# Patient Record
Sex: FEMALE | Race: WHITE | URBAN - METROPOLITAN AREA
[De-identification: names, ages, dates, MRNs, and addresses within clinical notes are randomized per-mention and may not be internally consistent; named-entity substitution may affect disease eponyms.]

---

## 2021-09-14 ENCOUNTER — INPATIENT (INPATIENT)
Facility: HOSPITAL | Age: 71
LOS: 0 days | Discharge: HOME | DRG: 915 | End: 2021-09-15
Attending: STUDENT IN AN ORGANIZED HEALTH CARE EDUCATION/TRAINING PROGRAM | Admitting: STUDENT IN AN ORGANIZED HEALTH CARE EDUCATION/TRAINING PROGRAM
Payer: MEDICARE

## 2021-09-14 VITALS — HEART RATE: 95 BPM | OXYGEN SATURATION: 96 % | RESPIRATION RATE: 18 BRPM | HEIGHT: 61 IN | WEIGHT: 176.37 LBS

## 2021-09-14 LAB
ALBUMIN SERPL ELPH-MCNC: 4.3 G/DL — SIGNIFICANT CHANGE UP (ref 3.3–5)
ALP SERPL-CCNC: 91 U/L — SIGNIFICANT CHANGE UP (ref 40–120)
ALT FLD-CCNC: 19 U/L — SIGNIFICANT CHANGE UP (ref 10–45)
ANION GAP SERPL CALC-SCNC: 11 MMOL/L — SIGNIFICANT CHANGE UP (ref 5–17)
APPEARANCE UR: CLEAR — SIGNIFICANT CHANGE UP
AST SERPL-CCNC: 25 U/L — SIGNIFICANT CHANGE UP (ref 10–40)
BASE EXCESS BLDA CALC-SCNC: -1.8 MMOL/L — SIGNIFICANT CHANGE UP (ref -2–3)
BASE EXCESS BLDV CALC-SCNC: 0.8 MMOL/L — SIGNIFICANT CHANGE UP (ref -2–3)
BASOPHILS # BLD AUTO: 0.04 K/UL — SIGNIFICANT CHANGE UP (ref 0–0.2)
BASOPHILS NFR BLD AUTO: 0.3 % — SIGNIFICANT CHANGE UP (ref 0–2)
BILIRUB SERPL-MCNC: 0.3 MG/DL — SIGNIFICANT CHANGE UP (ref 0.2–1.2)
BILIRUB UR-MCNC: NEGATIVE — SIGNIFICANT CHANGE UP
BUN SERPL-MCNC: 18 MG/DL — SIGNIFICANT CHANGE UP (ref 7–23)
CA-I SERPL-SCNC: 1.18 MMOL/L — SIGNIFICANT CHANGE UP (ref 1.15–1.33)
CALCIUM SERPL-MCNC: 9.9 MG/DL — SIGNIFICANT CHANGE UP (ref 8.4–10.5)
CHLORIDE SERPL-SCNC: 104 MMOL/L — SIGNIFICANT CHANGE UP (ref 96–108)
CO2 BLDA-SCNC: 25 MMOL/L — HIGH (ref 19–24)
CO2 BLDV-SCNC: 30.3 MMOL/L — HIGH (ref 22–26)
CO2 SERPL-SCNC: 27 MMOL/L — SIGNIFICANT CHANGE UP (ref 22–31)
COLOR SPEC: YELLOW — SIGNIFICANT CHANGE UP
CREAT SERPL-MCNC: 0.92 MG/DL — SIGNIFICANT CHANGE UP (ref 0.5–1.3)
DIFF PNL FLD: NEGATIVE — SIGNIFICANT CHANGE UP
EOSINOPHIL # BLD AUTO: 0.06 K/UL — SIGNIFICANT CHANGE UP (ref 0–0.5)
EOSINOPHIL NFR BLD AUTO: 0.4 % — SIGNIFICANT CHANGE UP (ref 0–6)
GAS PNL BLDV: 136 MMOL/L — SIGNIFICANT CHANGE UP (ref 136–145)
GAS PNL BLDV: SIGNIFICANT CHANGE UP
GLUCOSE SERPL-MCNC: 144 MG/DL — HIGH (ref 70–99)
GLUCOSE UR QL: NEGATIVE — SIGNIFICANT CHANGE UP
HCO3 BLDA-SCNC: 24 MMOL/L — SIGNIFICANT CHANGE UP (ref 21–28)
HCO3 BLDV-SCNC: 28 MMOL/L — SIGNIFICANT CHANGE UP (ref 22–29)
HCT VFR BLD CALC: 43.3 % — SIGNIFICANT CHANGE UP (ref 34.5–45)
HGB BLD-MCNC: 14 G/DL — SIGNIFICANT CHANGE UP (ref 11.5–15.5)
IMM GRANULOCYTES NFR BLD AUTO: 0.5 % — SIGNIFICANT CHANGE UP (ref 0–1.5)
KETONES UR-MCNC: NEGATIVE — SIGNIFICANT CHANGE UP
LACTATE SERPL-SCNC: 2.1 MMOL/L — HIGH (ref 0.5–2)
LEUKOCYTE ESTERASE UR-ACNC: NEGATIVE — SIGNIFICANT CHANGE UP
LYMPHOCYTES # BLD AUTO: 0.98 K/UL — LOW (ref 1–3.3)
LYMPHOCYTES # BLD AUTO: 6.6 % — LOW (ref 13–44)
MCHC RBC-ENTMCNC: 28.9 PG — SIGNIFICANT CHANGE UP (ref 27–34)
MCHC RBC-ENTMCNC: 32.3 GM/DL — SIGNIFICANT CHANGE UP (ref 32–36)
MCV RBC AUTO: 89.3 FL — SIGNIFICANT CHANGE UP (ref 80–100)
MONOCYTES # BLD AUTO: 0.21 K/UL — SIGNIFICANT CHANGE UP (ref 0–0.9)
MONOCYTES NFR BLD AUTO: 1.4 % — LOW (ref 2–14)
NEUTROPHILS # BLD AUTO: 13.4 K/UL — HIGH (ref 1.8–7.4)
NEUTROPHILS NFR BLD AUTO: 90.8 % — HIGH (ref 43–77)
NITRITE UR-MCNC: NEGATIVE — SIGNIFICANT CHANGE UP
NRBC # BLD: 0 /100 WBCS — SIGNIFICANT CHANGE UP (ref 0–0)
NT-PROBNP SERPL-SCNC: 63 PG/ML — SIGNIFICANT CHANGE UP (ref 0–300)
PCO2 BLDA: 42 MMHG — HIGH (ref 32–35)
PCO2 BLDV: 58 MMHG — HIGH (ref 39–42)
PH BLDA: 7.36 — SIGNIFICANT CHANGE UP (ref 7.35–7.45)
PH BLDV: 7.3 — LOW (ref 7.32–7.43)
PH UR: 7 — SIGNIFICANT CHANGE UP (ref 5–8)
PLATELET # BLD AUTO: 251 K/UL — SIGNIFICANT CHANGE UP (ref 150–400)
PO2 BLDA: 89 MMHG — SIGNIFICANT CHANGE UP (ref 83–108)
PO2 BLDV: <35 MMHG — SIGNIFICANT CHANGE UP (ref 25–45)
POTASSIUM BLDV-SCNC: 3.9 MMOL/L — SIGNIFICANT CHANGE UP (ref 3.5–5.1)
POTASSIUM SERPL-MCNC: 4.3 MMOL/L — SIGNIFICANT CHANGE UP (ref 3.5–5.3)
POTASSIUM SERPL-SCNC: 4.3 MMOL/L — SIGNIFICANT CHANGE UP (ref 3.5–5.3)
PROT SERPL-MCNC: 7.6 G/DL — SIGNIFICANT CHANGE UP (ref 6–8.3)
PROT UR-MCNC: NEGATIVE MG/DL — SIGNIFICANT CHANGE UP
RBC # BLD: 4.85 M/UL — SIGNIFICANT CHANGE UP (ref 3.8–5.2)
RBC # FLD: 13 % — SIGNIFICANT CHANGE UP (ref 10.3–14.5)
SAO2 % BLDA: 98.2 % — HIGH (ref 94–98)
SAO2 % BLDV: 55.2 % — LOW (ref 67–88)
SARS-COV-2 RNA SPEC QL NAA+PROBE: SIGNIFICANT CHANGE UP
SODIUM SERPL-SCNC: 142 MMOL/L — SIGNIFICANT CHANGE UP (ref 135–145)
SP GR SPEC: 1.01 — SIGNIFICANT CHANGE UP (ref 1–1.03)
TROPONIN T SERPL-MCNC: <0.01 NG/ML — SIGNIFICANT CHANGE UP (ref 0–0.01)
UROBILINOGEN FLD QL: 0.2 E.U./DL — SIGNIFICANT CHANGE UP
WBC # BLD: 14.77 K/UL — HIGH (ref 3.8–10.5)
WBC # FLD AUTO: 14.77 K/UL — HIGH (ref 3.8–10.5)

## 2021-09-14 PROCEDURE — 71046 X-RAY EXAM CHEST 2 VIEWS: CPT | Mod: 26,59

## 2021-09-14 PROCEDURE — 99285 EMERGENCY DEPT VISIT HI MDM: CPT | Mod: 25

## 2021-09-14 PROCEDURE — 99291 CRITICAL CARE FIRST HOUR: CPT

## 2021-09-14 PROCEDURE — 31500 INSERT EMERGENCY AIRWAY: CPT

## 2021-09-14 PROCEDURE — 93010 ELECTROCARDIOGRAM REPORT: CPT | Mod: NC,59

## 2021-09-14 PROCEDURE — 71045 X-RAY EXAM CHEST 1 VIEW: CPT | Mod: 26

## 2021-09-14 RX ORDER — PROPOFOL 10 MG/ML
50 INJECTION, EMULSION INTRAVENOUS
Qty: 500 | Refills: 0 | Status: DISCONTINUED | OUTPATIENT
Start: 2021-09-14 | End: 2021-09-14

## 2021-09-14 RX ORDER — ENOXAPARIN SODIUM 100 MG/ML
40 INJECTION SUBCUTANEOUS AT BEDTIME
Refills: 0 | Status: DISCONTINUED | OUTPATIENT
Start: 2021-09-14 | End: 2021-09-14

## 2021-09-14 RX ORDER — NOREPINEPHRINE BITARTRATE/D5W 8 MG/250ML
0.05 PLASTIC BAG, INJECTION (ML) INTRAVENOUS
Qty: 8 | Refills: 0 | Status: DISCONTINUED | OUTPATIENT
Start: 2021-09-14 | End: 2021-09-15

## 2021-09-14 RX ORDER — PROPOFOL 10 MG/ML
20 INJECTION, EMULSION INTRAVENOUS ONCE
Refills: 0 | Status: COMPLETED | OUTPATIENT
Start: 2021-09-14 | End: 2021-09-14

## 2021-09-14 RX ORDER — SODIUM CHLORIDE 9 MG/ML
1000 INJECTION INTRAMUSCULAR; INTRAVENOUS; SUBCUTANEOUS ONCE
Refills: 0 | Status: COMPLETED | OUTPATIENT
Start: 2021-09-14 | End: 2021-09-14

## 2021-09-14 RX ORDER — ENOXAPARIN SODIUM 100 MG/ML
40 INJECTION SUBCUTANEOUS EVERY 12 HOURS
Refills: 0 | Status: DISCONTINUED | OUTPATIENT
Start: 2021-09-14 | End: 2021-09-15

## 2021-09-14 RX ORDER — FENTANYL CITRATE 50 UG/ML
0.5 INJECTION INTRAVENOUS
Qty: 2500 | Refills: 0 | Status: DISCONTINUED | OUTPATIENT
Start: 2021-09-14 | End: 2021-09-15

## 2021-09-14 RX ORDER — CHLORHEXIDINE GLUCONATE 213 G/1000ML
1 SOLUTION TOPICAL
Refills: 0 | Status: DISCONTINUED | OUTPATIENT
Start: 2021-09-14 | End: 2021-09-15

## 2021-09-14 RX ORDER — PROPOFOL 10 MG/ML
50 INJECTION, EMULSION INTRAVENOUS
Qty: 1000 | Refills: 0 | Status: DISCONTINUED | OUTPATIENT
Start: 2021-09-14 | End: 2021-09-15

## 2021-09-14 RX ADMIN — PROPOFOL 20 MILLIGRAM(S): 10 INJECTION, EMULSION INTRAVENOUS at 17:00

## 2021-09-14 RX ADMIN — PROPOFOL 24 MICROGRAM(S)/KG/MIN: 10 INJECTION, EMULSION INTRAVENOUS at 17:10

## 2021-09-14 RX ADMIN — FENTANYL CITRATE 4.31 MICROGRAM(S)/KG/HR: 50 INJECTION INTRAVENOUS at 17:30

## 2021-09-14 RX ADMIN — Medication 8.08 MICROGRAM(S)/KG/MIN: at 21:24

## 2021-09-14 RX ADMIN — SODIUM CHLORIDE 1000 MILLILITER(S): 9 INJECTION INTRAMUSCULAR; INTRAVENOUS; SUBCUTANEOUS at 19:05

## 2021-09-14 RX ADMIN — PROPOFOL 20 MILLIGRAM(S): 10 INJECTION, EMULSION INTRAVENOUS at 16:50

## 2021-09-14 NOTE — ED ADULT NURSE NOTE - CHIEF COMPLAINT QUOTE
pt sustained bronchospasm during intubation at a plastic surgery office; pt was intubated successfully but then transported here for medical management; ETT 6.5 22 @ lip

## 2021-09-14 NOTE — H&P ADULT - ASSESSMENT
This is a 71 year old F with PMHx of HTN, HLD, anaphylaxis presented to [facility] ED intubated after difficult airway in ambulatory surgical center with concern for anaphylactic reaction to rocuronium. Admitted for controlled extubation for 9/15.      NEUROLOGY  #Encephalopathy due to sedation  Low concern for anoxic brain injury per report with no desaturations, following commands and moving extremities with sedation lifted  - maintain RASS -1 to -2 until SBT in AM  - full neuro exam during SBT    PULMONARY  #Difficult Airway  Patient with 3 failed DL attempts and some bloody sputum afterwards which resolved per anesthesiologist  - plan for extubation with tube exchanger and anesthesia at bedside 9/15 AM  - no angioedema was seen; most likely this was due to an allergic reaction as epinephrine, decadron, and benadryl relieved the sx    #Anaphylaxis  Patient s/p epinephrine, benadryl, decadron, nebs at outpatient surgical center. Unclear whether this is resolved anaphylaxis or instead traumatic/difficult intubation  - monitor for dermatologic signs or bronchospasm on extubation  - avoid rocuronium    #Hypercapnic respiratory failure  VBG pCO2 58, which may correlate with obstruction 2/2 anphylaxis, but does not correlate with clear lungs on exam without recent albuterol.  - ABG to confirm ventilation, titrate vent settings prn  - initial ABG results showed adequate ventilation settings for overnight; will continue to monitor pt's response     CARDIAC  #Distributive shock 2/2 vasoplegia  BP dropped from 120/80 down to 70s/50s shortly after sedation was increased for vent compliance. Patient was bolused with 1L NS and levo was started then quickly weaned off. Likely vasoplegia from sedation which was fluid responsive.  - wean off levo  - fluids as needed for SBP<90    #HTN  On amlodipine 5mg, aspirin 81mg  - on extubation can start home meds as needed    GASTROINTESTINAL  #Concern for Ileus/abdominal distension  - decompress abdomen with sump    RENAL  #C/f diabetes insipidus  Clear urine in muñiz on ED admission, but low suspicion for cerebral herniation given no deficits when sedation lifted. May instead have received diuretics without signout as such or copious fluids while at surgical center.  - UA - clear; no infection or blood    - f/u urine lytes    ENDOCRINE  #no active issues    HEME  #no active issues     INFECTIOUS  #Leukocytosis  Likely 2/2 difficult intubation. Patient may have some degree of aspiration but no consolidation seen on CXR and no fever  - trend CBC, CXR      Fluids: s/p 1L NS in ED  Electrolytes: replete K>4, Mg>2, Phos>2.5  Diet: NPO  DVT Prophylaxis: Lovenox  GI Prophylaxis: not indicated  Lines: PIV x2  Code Status: Full code  Dispo: MICU

## 2021-09-14 NOTE — CONSULT NOTE ADULT - ATTENDING COMMENTS
Patient with acute respiratory failure of unclear etiology though difficulty intubating except for number 6 ETT may point to airway narrowing issue maybe from laryngospasm, not much else clinically going with anaphylaxis though this is a consideration. She has a positive leak test now; will try to extubate with anesthesia in AM. Transient hypotension likely sedation related has resolved.

## 2021-09-14 NOTE — ED PROVIDER NOTE - OBJECTIVE STATEMENT
70 y/o F w/ Hx of HTN and HLD was at a surgery clinic for a face lift today. Anesthesiologist gave her rocuronium and pt started to have spasms. As per anesthesiologist, unable to back her well, gave micro epi total 100 mg IV over an hr. Pt intubated using RSI. Pt given decadron and benadryl for presumed anaphylaxis to rocuronium. Upon arrival to ED, pt intubated w/ 6.5 ET tube and placed on 15 mg propofol.

## 2021-09-14 NOTE — ED ADULT NURSE NOTE - NSIMPLEMENTINTERV_GEN_ALL_ED
Implemented All Fall Risk Interventions:  [place] to call system. Call bell, personal items and telephone within reach. Instruct patient to call for assistance. Room bathroom lighting operational. Non-slip footwear when patient is off stretcher. Physically safe environment: no spills, clutter or unnecessary equipment. Stretcher in lowest position, wheels locked, appropriate side rails in place. Provide visual cue, wrist band, yellow gown, etc. Monitor gait and stability. Monitor for mental status changes and reorient to person, place, and time. Review medications for side effects contributing to fall risk. Reinforce activity limits and safety measures with patient and family.

## 2021-09-14 NOTE — H&P ADULT - NSHPPHYSICALEXAM_GEN_ALL_CORE
ICU Vital Signs Last 24 Hrs  T(C): 36.2 (14 Sep 2021 22:15), Max: 36.2 (14 Sep 2021 22:15)  T(F): 97.2 (14 Sep 2021 22:15), Max: 97.2 (14 Sep 2021 22:15)  HR: 64 (15 Sep 2021 00:00) (60 - 95)  BP: 109/57 (15 Sep 2021 00:00) (78/51 - 186/77)  BP(mean): 78 (15 Sep 2021 00:00) (75 - 93)  ABP: --  ABP(mean): --  RR: 12 (15 Sep 2021 00:00) (12 - 18)  SpO2: 97% (15 Sep 2021 00:00) (96% - 100%)    Mode: AC/ CMV (Assist Control/ Continuous Mandatory Ventilation)  RR (machine): 12  TV (machine): 450  FiO2: 50  PEEP: 5  ITime: 1  MAP: 9.7  PIP: 27    General: obese female intubated and sedated  HEENT:  PERRL, bilaterally narrow prior to levo initiation   Lungs: CTA B/L with slightly coarse breath sounds typical for 6.5 tube; audible cuff leak when cuff deflated at bedside  Cardiovascular: regular r/r, no MRG  Gastrointestinal: soft, nontender, no organomegaly appreciated  Extremities: 2+ b/l MAAME at ankles, up to 1+ at shins    : muñiz in place  Skin: warm; intact along chest, abdomen, legs, arms, groin  Neurological: sedation lifted to fent 2.5 prop 10, patient opening eyes spontaneously, nodding head to command, moving all extremities spontaneously and to command  Vasc: 2+ pulses ICU Vital Signs Last 24 Hrs  T(C): 36.2 (14 Sep 2021 22:15), Max: 36.2 (14 Sep 2021 22:15)  T(F): 97.2 (14 Sep 2021 22:15), Max: 97.2 (14 Sep 2021 22:15)  HR: 64 (15 Sep 2021 00:00) (60 - 95)  BP: 109/57 (15 Sep 2021 00:00) (78/51 - 186/77)  BP(mean): 78 (15 Sep 2021 00:00) (75 - 93)  ABP: --  ABP(mean): --  RR: 12 (15 Sep 2021 00:00) (12 - 18)  SpO2: 97% (15 Sep 2021 00:00) (96% - 100%)    Mode: AC/ CMV (Assist Control/ Continuous Mandatory Ventilation)  RR (machine): 12  TV (machine): 450  FiO2: 50  PEEP: 5  ITime: 1  MAP: 9.7  PIP: 27    General: obese female intubated and sedated  HEENT:  PERRL, bilaterally narrow prior to levo initiation   Lungs: CTA B/L with slightly coarse breath sounds typical for 6.5 tube; audible cuff leak when cuff deflated at bedside  Cardiovascular: regular r/r, no MRG  Gastrointestinal: soft, nontender, no organomegaly appreciated  Extremities: 2+ b/l MAAME at ankles, up to 1+ at shins    : muñiz in place  Skin: warm; intact along chest, abdomen, legs, arms, groin  Vasc: 2+ pulses

## 2021-09-14 NOTE — ED PROVIDER NOTE - CLINICAL SUMMARY MEDICAL DECISION MAKING FREE TEXT BOX
70 y/o F presents today after spasms after receiving rocuronium at surgical clinic for face lift today. Will obtain basic labs and admit. 70 y/o F presents today after spasms after receiving rocuronium at surgical clinic for face lift today. Will obtain basic labs and admit.   pt intubated, upon arrival pt was awakening, given propofol x 2 20mg,   history obtained from anesthesiologist and plastic surgeon performing the surgery at the outpt clinic  as per anesthesiologist pt was likely allergic ro rocuronium. after pt given matt, pt was noted to have difficulty with airway and "bagging"  pt given 100mcg of epi total   pt intubated with tube 6.5 tube as per anesthesiologist  pt was a difficult intubation  pt to be exutabed inpt. micu consulted. micu agrees with plan, will observe pt for possible anaphylaxis and to keep pt intubated as for now.   pt was started on propofol drip and fentanyl added as pt was awakening and bp was starting to normalize and drop.

## 2021-09-14 NOTE — H&P ADULT - HISTORY OF PRESENT ILLNESS
This is a 71 year old F with PMHx of HTN, HLD, prior anaphylaxis with unknown trigger x2, allergies to latex and penicillin presents from the ambulatory surgery center of Dr. Magana intubated with concern for anaphylaxis during intubation prior to elective facelift. History obtained by anesthesiologist who did the case, Dr. Yulia Browne. The patient bagged easily on propofol, fentanyl, and versed and preoxygenated for ~10 minutes. Rocuronium was administered, and immediately after, the patient was noted to be much more difficult to bag with maximum tidal volumes 50-100cc. Due to concern for bronchospasm, intubation was continued including 3 failed attempts at direct laryngoscopy then successful intubation with 6.5 tube over a bougie. During this time, no desaturations were noted, and the patient was then ventilated with 100% FiO2. Epinephrine was administered intravenously, 2 albuterol nebulizers were given, 10mg decadron, and an unknown amount of IV benadryl. Due to concern for ongoing anaphylaxis, surgery was aborted. The patient was evaluated for the next 2 hours at the surgical center and was noted to be tolerating pressure support with minimal settings other than 100% FiO2. Extubation was considered, but upon deflating the cuff there was an audible cuff leak but concern for persistent bronchospasm as well as poor vent compliance on light sedation. Given the difficult airway and unclear resolution of suspected anaphylaxis, the patient was transferred to [facility] for further evaluation. Patient has had 2 episodes of anaphylaxis before per anesthesiologist. Once was a reaction to an unspecified plant, the other has no known source. Patient did not require intubation during those episodes, and this was her first time under general anesthesia. This is a 71 year old F with PMHx of HTN, HLD, prior anaphylaxis with unknown trigger x2, allergies to latex and penicillin presents from the ambulatory surgery center of Dr. Magana intubated with concern for anaphylaxis during intubation prior to elective facelift. History obtained by anesthesiologist who did the case, Dr. Yulia Browne. The patient bagged easily on propofol, fentanyl, and versed and preoxygenated for ~10 minutes. Rocuronium was administered, and immediately after, the patient was noted to be much more difficult to bag with maximum tidal volumes 50-100cc. Due to concern for bronchospasm, intubation was continued including 3 failed attempts at direct laryngoscopy then successful intubation with 6.5 tube over a bougie. During this time, no desaturations were noted, and the patient was then ventilated with 100% FiO2. Epinephrine was administered intravenously, 2 albuterol nebulizers were given, 10mg decadron, and an unknown amount of IV benadryl. Due to concern for ongoing anaphylaxis, surgery was aborted. The patient was evaluated for the next 2 hours at the surgical center and was noted to be tolerating pressure support with minimal settings other than 100% FiO2. Extubation was considered, but upon deflating the cuff there was an audible cuff leak but concern for persistent bronchospasm as well as poor vent compliance on light sedation. Given the difficult airway and unclear resolution of suspected anaphylaxis, the patient was transferred to [facility] for further evaluation. Patient has had 2 episodes of anaphylaxis before per anesthesiologist. Once was a reaction to an unspecified plant, the other has no known source. Patient did not require intubation during those episodes, and this was her first time under general anesthesia.    ED vitals: T 96.1, HR 83, /63, , SpO2 100% on 50% FiO2  Labs significant: WBC 14.77, lactate 2.1, VBG with pCO2 58 and O2 55%  Imaging/EKG: CXR: rotated exam with poor penetration, no gross consolidations  Interventions prior to ED: ~100mcg epinephrine, 10mg decadron, albuterol x3, IV Benadryl  Interventions in ED: 1L NS, norepinephrine

## 2021-09-14 NOTE — CONSULT NOTE ADULT - SUBJECTIVE AND OBJECTIVE BOX
Patient is a 71y old  Female who presents with a chief complaint of hypoxic respiratory failure, difficult airway    Consult reason: intubation  ED vitals: T 96.1  HR 83   /63 SpO2 100% on 50% FiO2  Labs significant: WBC 14.77, lactate 2.1, VBG with pCO2 58 and O2 55%  Imaging/EKG: CXR: rotated exam with poor penetration, no gross consolidations  Interventions prior to ED: ~100mcg epinephrine, 10mg decadron, albuterol x3, IV Benadryl  Interventions in ED: 1L NS, norepinephrine    HPI:  71 F with PMHx HTN, HLD, prior anaphylaxis with unknown trigger x2, allergies to latex and penicillin presents from the ambulatory surgery center of Dr. Magana intubated with concern for anaphylaxis during intubation prior to elective facelift. History obtained by anesthesiologist who did the case: Yulia Browne. She was in the process of being intubated for an elective facelift. The patient bagged easily on propofol, fentanyl, and versed and preoxygenated for ~10 minutes. Rocuronium was administered, and immediately after, the patient was noted to be much more difficult to bag with maximum tidal volumes 50-100cc. Due to concern for bronchospasm, intubation was continued including 3 failed attempts at direct laryngoscopy then successful intubation with 6.5 tube over a bougie. During this time, no desaturations were noted, and the patient was then ventilated with 100% FiO2. Epinephrine was administered intravenously, 2 albuterol nebulizers were given, 10mg decadron, and an unknown amount of IV benadryl. Due to concern for ongoing anaphylaxis, surgery was aborted. Scant bloody sputum was suctioned from the tube that spontaneously resolved. The patient was evaluated for the next 2 hours at the surgical center and was noted to be tolerating pressure support with minimal settings other than 100% FiO2. Extubation was considered, but upon deflating the cuff there was an audible cuff leak but concern for persistent bronchospasm as well as poor vent compliance on light sedation. Given the difficult airway and unclear resolution of suspected anaphylaxis, the patient was transferred to [facility] for further evaluation.    Patient has had 2 episodes of anaphylaxis before per anesthesiologist. Once was a reaction to an unspecified plant, the other has no known source. Patient did not require intubation during those episodes, and this was her first time under general anesthesia.    On ICU consult arrival at ED patient was deeply sedated with propofol 50 and fentanyl 1 due to previous vent compliance. FiO2 weaned down to 50% with SpO2 100% then to 35% with SpO2 95%. ICU consulted due to intubation with concern for difficult airway should the patient require reintubation.      Allergies    latex (Unknown)  penicillin (Anaphylaxis)  succinylcholine charted allergy by ED, per anethesiologist this is not an allergy, but rather the patient's son once had bradycardia in response to succinylcholine    Intolerances      Home Medications: amlodipine 5mg, aspirin 81mg, vitamin D, biotin, probiotics      SOCIAL HX:     Smoking: former smoker, quit   ETOH/Illicit drugs: none  Occupation    PAST MEDICAL & SURGICAL HISTORY:      FAMILY HISTORY:  :    No known cardiovascular or pulmonary family history   sister with Crohn's disease  son allergic to succinylcholine    ROS:  See HPI     PHYSICAL EXAM    ICU Vital Signs Last 24 Hrs  T(C): 35.7 (14 Sep 2021 18:52), Max: 35.7 (14 Sep 2021 18:52)  T(F): 96.2 (14 Sep 2021 18:52), Max: 96.2 (14 Sep 2021 18:52)  HR: 67 (14 Sep 2021 19:32) (60 - 95)  BP: 110/58 (14 Sep 2021 19:32) (78/51 - 186/77)  BP(mean): --  ABP: --  ABP(mean): --  RR: 14 (14 Sep 2021 17:01) (14 - 18)  SpO2: 99% (14 Sep 2021 19:32) (96% - 100%)      General: obese female intubated and sedated  HEENT:  PERRL, bilaterally narrow prior to levo initiation   Lungs: CTAB/L with slightly coarse breath sounds typical for 6.5 tube; audible cuff leak when cuff deflated at bedside  Cardiovascular: Regular r/r, no MRG  Gastrointestinal: Soft, slightly distended vs obese with slight tympani, no organomegaly appreciated  Extremities: 2+ b/l MAAME at ankles, up to 1+ at shins    Skin: Warm.  Intact along chest, abdomen, legs, arms, groin  Neurological: sedation lifted to fent 2.5 prop 10, patient opening eyes spontaneously, nodding head to command, moving all extremities spontaneously        LABS:                          14.0   14.77 )-----------( 251      ( 14 Sep 2021 17:19 )             43.3                                               09-14    142  |  104  |  18  ----------------------------<  144<H>  4.3   |  27  |  0.92    Ca    9.9      14 Sep 2021 17:19    TPro  7.6  /  Alb  4.3  /  TBili  0.3  /  DBili  x   /  AST  25  /  ALT  19  /  AlkPhos  91                                               Urinalysis Basic - ( 14 Sep 2021 17:19 )    Color: Yellow / Appearance: Clear / S.015 / pH: x  Gluc: x / Ketone: NEGATIVE  / Bili: Negative / Urobili: 0.2 E.U./dL   Blood: x / Protein: NEGATIVE mg/dL / Nitrite: NEGATIVE   Leuk Esterase: NEGATIVE / RBC: x / WBC x   Sq Epi: x / Non Sq Epi: x / Bacteria: x        CARDIAC MARKERS ( 14 Sep 2021 17:19 )  x     / <0.01 ng/mL / x     / x     / x                                                LIVER FUNCTIONS - ( 14 Sep 2021 17:19 )  Alb: 4.3 g/dL / Pro: 7.6 g/dL / ALK PHOS: 91 U/L / ALT: 19 U/L / AST: 25 U/L / GGT: x                                                                                                   Mode: AC/ CMV (Assist Control/ Continuous Mandatory Ventilation)  RR (machine): 12  TV (machine): 450  FiO2: 50  PEEP: 5  ITime: 1  MAP: 9  PIP: 27                                          CXR:    ECHO:    MEDICATIONS  (STANDING):  fentaNYL   Infusion. 0.5 MICROgram(s)/kG/Hr (4.31 mL/Hr) IV Continuous <Continuous>  propofol Infusion 50 MICROgram(s)/kG/Min (24 mL/Hr) IV Continuous <Continuous>    MEDICATIONS  (PRN):         Patient is a 71y old  Female who presents with a chief complaint of hypoxic respiratory failure, difficult airway    Consult reason: intubation  ED vitals: T 96.1  HR 83   /63 SpO2 100% on 50% FiO2  Labs significant: WBC 14.77, lactate 2.1, VBG with pCO2 58 and O2 55%  Imaging/EKG: CXR: rotated exam with poor penetration, no gross consolidations  Interventions prior to ED: ~100mcg epinephrine, 10mg decadron, albuterol x3, IV Benadryl  Interventions in ED: 1L NS, norepinephrine    HPI:  71 F with PMHx HTN, HLD, prior anaphylaxis with unknown trigger x2, allergies to latex and penicillin presents from the ambulatory surgery center of Dr. Magana intubated with concern for anaphylaxis during intubation prior to elective facelift. History obtained by anesthesiologist who did the case: Yulia Browne. She was in the process of being intubated for an elective facelift. The patient bagged easily on propofol, fentanyl, and versed and preoxygenated for ~10 minutes. Rocuronium was administered, and immediately after, the patient was noted to be much more difficult to bag with maximum tidal volumes 50-100cc. Due to concern for bronchospasm, intubation was continued including 3 failed attempts at direct laryngoscopy then successful intubation with 6.5 tube over a bougie. During this time, no desaturations were noted, and the patient was then ventilated with 100% FiO2. Epinephrine was administered intravenously, 2 albuterol nebulizers were given, 10mg decadron, and an unknown amount of IV benadryl. Due to concern for ongoing anaphylaxis, surgery was aborted. Scant bloody sputum was suctioned from the tube that spontaneously resolved. The patient was evaluated for the next 2 hours at the surgical center and was noted to be tolerating pressure support with minimal settings other than 100% FiO2. Extubation was considered, but upon deflating the cuff there was an audible cuff leak but concern for persistent bronchospasm as well as poor vent compliance on light sedation. Given the difficult airway and unclear resolution of suspected anaphylaxis, the patient was transferred to [facility] for further evaluation.    Patient has had 2 episodes of anaphylaxis before per anesthesiologist. Once was a reaction to an unspecified plant, the other has no known source. Patient did not require intubation during those episodes, and this was her first time under general anesthesia.    On ICU consult arrival at ED patient was deeply sedated with propofol 50 and fentanyl 1 due to previous vent compliance. FiO2 weaned down to 50% with SpO2 100% then to 35% with SpO2 95%. ICU consulted due to intubation with concern for difficult airway should the patient require reintubation.      Allergies    latex (Unknown)  penicillin (Anaphylaxis)  succinylcholine charted allergy by ED, per anethesiologist this is not an allergy, but rather the patient's son once had bradycardia in response to succinylcholine    Intolerances      Home Medications: amlodipine 5mg, aspirin 81mg, vitamin D, biotin, probiotics      SOCIAL HX:     Smoking: former smoker, quit   ETOH/Illicit drugs: none  Occupation    PAST MEDICAL & SURGICAL HISTORY:      FAMILY HISTORY:  :    No known cardiovascular or pulmonary family history   sister with Crohn's disease  son allergic to succinylcholine    ROS:  See HPI     PHYSICAL EXAM    ICU Vital Signs Last 24 Hrs  T(C): 35.7 (14 Sep 2021 18:52), Max: 35.7 (14 Sep 2021 18:52)  T(F): 96.2 (14 Sep 2021 18:52), Max: 96.2 (14 Sep 2021 18:52)  HR: 67 (14 Sep 2021 19:32) (60 - 95)  BP: 110/58 (14 Sep 2021 19:32) (78/51 - 186/77)  BP(mean): --  ABP: --  ABP(mean): --  RR: 14 (14 Sep 2021 17:01) (14 - 18)  SpO2: 99% (14 Sep 2021 19:32) (96% - 100%)      General: obese female intubated and sedated  HEENT:  PERRL, bilaterally narrow prior to levo initiation   Lungs: CTAB/L with slightly coarse breath sounds typical for 6.5 tube; audible cuff leak when cuff deflated at bedside  Cardiovascular: Regular r/r, no MRG  Gastrointestinal: Soft, slightly distended vs obese with slight tympani, no organomegaly appreciated  Extremities: 2+ b/l MAAME at ankles, up to 1+ at shins    : muñiz in place  Skin: Warm.  Intact along chest, abdomen, legs, arms, groin  Neurological: sedation lifted to fent 2.5 prop 10, patient opening eyes spontaneously, nodding head to command, moving all extremities spontaneously and to command  Vasc: 2+ pulses        LABS:                          14.0   14.77 )-----------( 251      ( 14 Sep 2021 17:19 )             43.3                                               -14    142  |  104  |  18  ----------------------------<  144<H>  4.3   |  27  |  0.92    Ca    9.9      14 Sep 2021 17:19    TPro  7.6  /  Alb  4.3  /  TBili  0.3  /  DBili  x   /  AST  25  /  ALT  19  /  AlkPhos  91  -14                                             Urinalysis Basic - ( 14 Sep 2021 17:19 )    Color: Yellow / Appearance: Clear / S.015 / pH: x  Gluc: x / Ketone: NEGATIVE  / Bili: Negative / Urobili: 0.2 E.U./dL   Blood: x / Protein: NEGATIVE mg/dL / Nitrite: NEGATIVE   Leuk Esterase: NEGATIVE / RBC: x / WBC x   Sq Epi: x / Non Sq Epi: x / Bacteria: x        CARDIAC MARKERS ( 14 Sep 2021 17:19 )  x     / <0.01 ng/mL / x     / x     / x                                                LIVER FUNCTIONS - ( 14 Sep 2021 17:19 )  Alb: 4.3 g/dL / Pro: 7.6 g/dL / ALK PHOS: 91 U/L / ALT: 19 U/L / AST: 25 U/L / GGT: x                                                                                                   Mode: AC/ CMV (Assist Control/ Continuous Mandatory Ventilation)  RR (machine): 12  TV (machine): 450  FiO2: 50  PEEP: 5  ITime: 1  MAP: 9  PIP: 27                                          CXR: clear    ECHO:    MEDICATIONS  (STANDING):  fentaNYL   Infusion. 0.5 MICROgram(s)/kG/Hr (4.31 mL/Hr) IV Continuous <Continuous>  propofol Infusion 50 MICROgram(s)/kG/Min (24 mL/Hr) IV Continuous <Continuous>    MEDICATIONS  (PRN):

## 2021-09-14 NOTE — ED PROVIDER NOTE - RATE
This patient has had normal x-ray results at [facility]. Routine followup with Dr. Ivory is recommended.  Normal hip source of her pain in her leg is probably her knee with degenerative arthritis I would follow-up Dr. Moise her PCP 92

## 2021-09-14 NOTE — H&P ADULT - ATTENDING COMMENTS
Patient with life threatening airway issue preoperative now stable with number 6 ETT. Will rest overnight and try to extubate tomorrow with anesthesiology. She had transient hypotension probably related to sedation which resolved with some time on NE and fluid bolus of a liter with decrease in overall propofol and fentanyl dose. She received these drugs preop and is now tolerating them suggesting there was no fentanyl induced rigidity.

## 2021-09-14 NOTE — H&P ADULT - NSHPLABSRESULTS_GEN_ALL_CORE
Labs                          14.0   14.77 )-----------( 251      ( 14 Sep 2021 17:19 )             43.3   09-14    142  |  104  |  18  ----------------------------<  144<H>  4.3   |  27  |  0.92    Ca    9.9      14 Sep 2021 17:19    TPro  7.6  /  Alb  4.3  /  TBili  0.3  /  DBili  x   /  AST  25  /  ALT  19  /  AlkPhos  91  09-14    Serum Pro-Brain Natriuretic Peptide: 63Troponin T, Serum: <0.01  ABG - ( 14 Sep 2021 23:28 )  pH, Arterial: 7.36  pH, Blood: x     /  pCO2: 42    /  pO2: 89    / HCO3: 24    / Base Excess: -1.8  /  SaO2: 98.2      Radiology  CXR: nrml

## 2021-09-14 NOTE — ED PROVIDER NOTE - NS ED MD DISPO ISOLATION TYPES
Recommend the book, Peaceful Parent, Happy Kids: How to Stop Yelling and Start Connecting by Sarah Hess, PhD. Also recommend Dr. Hess's website [employer].    Recommend the children's books What To Do When You Worry Too Much and Outsmarting Worry by Amalia Lennon, PhD and Sitting Still Like a Frog: Mindfulness Exercises for Kids (and Their Parents) by Elle Mitchell.    Recommend the anxiety resources for parents:  Keys to Parenting Your Anxious Child (Third Edition) by Wanda Garrett MD  Freeing Your Child From Anxiety by Madeleine Maldonado PhD  Helping Your Anxious Child: A Step-by-Step Guide for Parents by Austin Artis PhD, Wanda Murillo, Janet Chavez PhD, Jillian Martell PhD, Mamie Lizarraga PhD  You and Your Anxious Child: Free Your Child from Fears and Worries and Create a Joyful Family Life by Serena Martinez, PhD  Anxious Kids, Anxious Parents: 7 Ways to Stop the Worry Cycle and Raise Courageous and Independent Children by Wilfredo Persaud and PARESH Iyer    Positive behavioral modification strategies    Self-motivation is an important ingredient for successful performance by children as well as adults. Youngsters who are noncompliant with rules, resistant in their attitude, and disrespectful, rebellious, and inappropriate in their conduct sometimes stop acting that way when given a timeout or when their pleasurable activities are taken away. However, punishing bad behavior is rarely sufficient to motivate children to want to do the right thing, and often creates more anger, less respect for adults, and an effort to “not get caught” rather than a genuine desire “to do the right thing.”    While “punishment” is an appropriate component of discipline, it should be reserved for those infrequent occasions when a child’s behavior is seriously inappropriate or places the safety of themselves or other people at risk. By contrast, the majority of discipline should be helping the child to learn from  their own experience that doing the right thing can pay off. Behavioral research has repeatedly demonstrated that rewards are far more effective in shaping behavior than punishing into submission. The following are recommendations for establishing a consistent, positive behavioral modification approach that takes this into account.    • Keep it simple and be consistent  • Start with just a few expectations: Use words that are specific and easily understood; explain exactly what results will be considered successful; and provide a demonstration if possible  • A child’s attitude can be just as easily included as chores and behavior, if the recognition is provided for an interval of time when the child’s attitude is appropriate (rather than completion of a task), for example talking softly for 15 minutes during dinner or brushing teeth after only being asked once--the goals of improving self-control and compliance with adult authority can be rewarded in addition to, or separate from, specific tasks  • Use language that creates a mental image of success in the mind of the child as much as possible, instead of repeatedly telling the child to stop doing things, which can reinforce negative outcomes: Examples include softly encouraging “talk softly,” rather than yelling “don’t yell;” or responding to a child’s kicks or punches with a gentle pat and “touch softly,” rather than smacking them while yelling “don’t hit”  • Children respond best to small, frequent, emotionally meaningful experiences, rather than large rewards given infrequently and long after the successful behavior occurs: Catching the child being good and giving them a small reward at the time is the best way to begin a positive behavioral approach  • Stickers or tickets for young children, poker chips for elementary school age children, and even just the initials of an adult/teacher on a 3x5 card for adolescents are meaningful ways that adults can recognize  appropriate attitude and/or behavior; the accumulation of these rewards can be assigned a point value or time interval that can be “cashed in” for an activity the child has decided in advance that they would like to earn  • Engage the child to be an active participant in designing the program from the beginning: They should be the one to pick the stickers, design the tickets, choose the color of the chips, or decide when and how to collect the initials for submitting homework and/or appropriately participating in class  • Rewards are better structured as privileges and activities (for example choosing dinner, one on one time with parent, electronics time, or special outing) instead of something purchased at a store  • Since children’s interests change more frequently, rewards that are meaningful may need to be changed on a daily or weekly basis--unless the reward is emotionally meaningful, the best designed behavior program will not lead to success  • Parents must be good to their word and keep their promise as a binding contract: It is essential that promises only be made when parents are sure they can be kept  • An essential rule that is often unintentionally violated by parents is that no reward that has already been earned should be taken away as punishment for some other misconduct that is unrelated to the behavior plan; for example if a child has earned a 75 minute play date for Saturday, however the teacher calls Friday to say that s/he stole something from another child’s locker, the Saturday play time should not be taken away as punishment  • While it is appropriate that this act to be punished, if the reward that has already been earned is taken away, s/he will likely eventually give up on the behavior program  • It often works best if each “token” is equivalent to a certain amount of time for a privilege or activity  • Although ending the activity is often a challenge, it can be made easier if children  have the opportunity to earn additional time for the next time, if they discontinue the current activity without argument when the time is completed  • Being creative, flexible, and soliciting ideas from children themselves can be useful in both designing and maintaining a positive behavioral modification program  • Coordinating the behavior program at school, , Religious, and community-based activities is extremely helpful so that the child will get the same, consistent message from all adults in authority    Patient Education   Fluoxetine Hydrochloride Oral solution [Depression/Mood Disorders]  What is this medicine?  FLUOXETINE (floo OX e teen) belongs to a class of drugs known as selective serotonin reuptake inhibitors (SSRIs). It can treat mood problems such as depression, obsessive compulsive disorder, and panic attacks. It can also treat certain eating disorders.  This medicine may be used for other purposes; ask your health care provider or pharmacist if you have questions.  What should I tell my health care provider before I take this medicine?  They need to know if you have any of these conditions:  · bipolar disorder or eduardo  · diabetes  · glaucoma  · liver disease  · psychosis  · seizures  · suicidal thoughts or history of attempted suicide  · an unusual or allergic reaction to fluoxetine, other medicines, foods, dyes, or preservatives  · pregnant or trying to get pregnant  · breast-feeding  How should I use this medicine?  Take this medicine by mouth. Follow the directions on the prescription label. Use a specially marked spoon or container to measure your medicine. Ask your pharmacist if you do not have one. Household spoons are not accurate. You can take this medicine with or without food. Take it at regular intervals. Do not take your medicine more often than directed. Do not stop taking this medicine suddenly except upon the advice of your doctor. Stopping this medicine too quickly may cause  serious side effects or your condition may worsen.  A special MedGuide will be given to you by the pharmacist with each prescription and refill. Be sure to read this information carefully each time.  Talk to your pediatrician regarding the use of this medicine in children. While this drug may be prescribed for children as young as 7 years for selected conditions, precautions do apply.  Overdosage: If you think you have taken too much of this medicine contact a poison control center or emergency room at once.  NOTE: This medicine is only for you. Do not share this medicine with others.  What if I miss a dose?  If you miss a dose, skip the missed dose and go back to your regular dosing schedule. Do not take double or extra doses.  What may interact with this medicine?  Do not take fluoxetine with any of the following medications:  · other medicines containing fluoxetine, like Sarafem or Symbyax  · cisapride  · linezolid  · MAOIs like Carbex, Eldepryl, Marplan, Nardil, and Parnate  · methylene blue (injected into a vein)  · pimozide  · thioridazine  This medicine may also interact with the following medications:  · alcohol  · aspirin and aspirin-like medicines  · carbamazepine  · certain medicines for depression, anxiety, or psychotic disturbances  · certain medicines for migraine headaches like almotriptan, eletriptan, frovatriptan, naratriptan, rizatriptan, sumatriptan, zolmitriptan  · digoxin  · diuretics  · fentanyl  · flecainide  · furazolidone  · isoniazid  · lithium  · medicines for sleep  · medicines that treat or prevent blood clots like warfarin, enoxaparin, and dalteparin  · NSAIDs, medicines for pain and inflammation, like ibuprofen or naproxen  · phenytoin  · procarbazine  · propafenone  · rasagiline  · ritonavir  · supplements like Kary's wort, kava kava, valerian  · tramadol  · tryptophan  · vinblastine  This list may not describe all possible interactions. Give your health care provider a list of  all the medicines, herbs, non-prescription drugs, or dietary supplements you use. Also tell them if you smoke, drink alcohol, or use illegal drugs. Some items may interact with your medicine.  What should I watch for while using this medicine?  Tell your doctor if your symptoms do not get better or if they get worse. Visit your doctor or health care professional for regular checks on your progress. Because it may take several weeks to see the full effects of this medicine, it is important to continue your treatment as prescribed by your doctor.  Patients and their families should watch out for new or worsening thoughts of suicide or depression. Also watch out for sudden changes in feelings such as feeling anxious, agitated, panicky, irritable, hostile, aggressive, impulsive, severely restless, overly excited and hyperactive, or not being able to sleep. If this happens, especially at the beginning of treatment or after a change in dose, call your health care professional.  You may get drowsy or dizzy. Do not drive, use machinery, or do anything that needs mental alertness until you know how this medicine affects you. Do not stand or sit up quickly, especially if you are an older patient. This reduces the risk of dizzy or fainting spells. Alcohol may interfere with the effect of this medicine. Avoid alcoholic drinks.  Your mouth may get dry. Chewing sugarless gum or sucking hard candy, and drinking plenty of water may help. Contact your doctor if the problem does not go away or is severe.  This medicine may affect blood sugar levels. If you have diabetes, check with your doctor or health care professional before you change your diet or the dose of your diabetic medicine.  What side effects may I notice from receiving this medicine?  Side effects that you should report to your doctor or health care professional as soon as possible:  · allergic reactions like skin rash, itching or hives, swelling of the face, lips, or  tongue  · breathing problems  · confusion  · eye pain, changes in vision  · fast or irregular heart rate, palpitations  · flu-like fever, chills, cough, muscle or joint aches and pains  · seizures  · suicidal thoughts or other mood changes  · swelling or redness in or around the eye  · tremors  · trouble sleeping  · unusual bleeding or bruising  · unusually tired or weak  · vomiting  Side effects that usually do not require medical attention (report to your doctor or health care professional if they continue or are bothersome):  · change in sex drive or performance  · diarrhea  · dry mouth  · flushing  · headache  · increased or decreased appetite  · nausea  · sweating  This list may not describe all possible side effects. Call your doctor for medical advice about side effects. You may report side effects to FDA at 1-205-FDA-2660.  Where should I keep my medicine?  Keep out of the reach of children.  Store at room temperature between 15 and 30 degrees C (59 and 86 degrees F). Throw away any unused medicine after the expiration date.  NOTE:This sheet is a summary. It may not cover all possible information. If you have questions about this medicine, talk to your doctor, pharmacist, or health care provider. Copyright© 2016 [employer]            None

## 2021-09-14 NOTE — CONSULT NOTE ADULT - ASSESSMENT
71 F with PMHx HTN, HLD, anaphylaxis presented to [facility] ED intubated after difficult airway in ambulatory surgical center with concern for anaphylactic reaction to rocuronium.  Difficult bagging after paralysis is not in itself diagnostic for anaphylaxis. The patient did not appear to have airway edema given that she had a cuff leak at the surgical center and here. It is possible she did have anaphylaxis is response to rocuronium or versed,  but difficult to make this diagnosis after resolution. Her history of prior anaphylactic reactions makes true anaphylaxis more plausible though, and it is reasonable to believe she appeared clinically to have an allergic reaction which appropriately responded to epinephrine, decadron, and benadryl.  However, the patient's difficult airway merits close monitoring and extubation in a controlled environment. She is s/p 3 failed DL attempts, so if the patient would require reintubation, tube exchange would be preferable      NEUROLOGIC/PSYCHIATRIC  #Encephalopathy due to sedation  Low concern for anoxic brain injury per report with no desaturations, following commands and moving extremities with sedation lifted  - maintain RASS -1 to -2 until SBT in AM  - full neuro exam during SBT    PULMONARY  #Difficult Airway  Patient with 3 failed DL attempts and some bloody sputum afterwards which resolved per anesthesiologist  - plan for extubation with tube exchanger and anesthesia at bedside 9/15 AM    #Concern for anaphylaxis  Patient s/p epinephrine, benadryl, decadron, nebs at outpatient surgical center. Unclear whether this is resolved anaphylaxis or instead traumatic/difficult intubation  - monitor for dermatologic signs or bronchospasm on extubation  - avoid rocuronium    #Concern for hypercapnic respiratory failure  VBG pCO2 58, which may correlate with obstruction 2/2 anphylaxis, but does not correlate with clear lungs on exam without recent albuterol.  - ABG to confirm ventilation, titrate vent settings prn    CARDIAC  #Distributive shock 2/2 vasoplegia  BP dropped from 120/80 down to 70s/50s shortly after sedation was increased for vent compliance. Patient was bolused with 1L NS and levo was started then quickly weaned off. Likely vasoplegia which was fluid responsive.  - wean off levo  - fluids as needed for SBP<90    #HTN  On amlodipine 5mg, aspirin 81mg  - on extubation can start home meds as needed    GASTROINTESTINAL  #Concern for Ileus/abdominal distension  - decompress abdomen with sump    RENAL  #C/f diabetes insipidus  Clear urine in muñiz on ED admission, but low suspicion for cerebral herniation given no deficits when sedation lifted. May instead have received diuretics without signout as such or copious fluids while at surgical center.  - UA, urine lytes    INFECTIOUS  #Leukocytosis  Likely 2/2 difficult intubation. Patient may have some degree of aspiration but no consolidation seen on CXR and no fever  - trend CBC, CXR    ENDOCRINE  #no active issues    MISC  Fluids: s/p 1L NS in ED  Electrolytes: replete K>3.8, Mg>1.8, Phos>2.5  Diet: NPO  DVT Prophylaxis: Lovenox  GI Prophylaxis: not indicated  Lines: PIV x2  Code Status: Full code  Dispo: MICU 71 F with PMHx HTN, HLD, anaphylaxis presented to [facility] ED intubated after difficult airway in ambulatory surgical center with concern for anaphylactic reaction to rocuronium.  Difficult bagging after paralysis is not in itself diagnostic for anaphylaxis. The patient did not appear to have airway edema given that she had a cuff leak at the surgical center and here. It is possible she did have anaphylaxis is response to rocuronium or versed,  but difficult to make this diagnosis after resolution. Her history of prior anaphylactic reactions makes true anaphylaxis more plausible though, and it is reasonable to believe she appeared clinically to have an allergic reaction which appropriately responded to epinephrine, decadron, and benadryl.  However, the patient's difficult airway merits close monitoring and extubation in a controlled environment. She is s/p 3 failed DL attempts, so if the patient would require reintubation, tube exchange would be preferable      NEUROLOGIC/PSYCHIATRIC  #Encephalopathy due to sedation  Low concern for anoxic brain injury per report with no desaturations, following commands and moving extremities with sedation lifted  - maintain RASS -1 to -2 until SBT in AM  - full neuro exam during SBT    PULMONARY  #Difficult Airway  Patient with 3 failed DL attempts and some bloody sputum afterwards which resolved per anesthesiologist  - plan for extubation with tube exchanger and anesthesia at bedside 9/15 AM    #Concern for anaphylaxis  Patient s/p epinephrine, benadryl, decadron, nebs at outpatient surgical center. Unclear whether this is resolved anaphylaxis or instead traumatic/difficult intubation  - monitor for dermatologic signs or bronchospasm on extubation  - avoid rocuronium    #Concern for hypercapnic respiratory failure  VBG pCO2 58, which may correlate with obstruction 2/2 anphylaxis, but does not correlate with clear lungs on exam without recent albuterol.  - ABG to confirm ventilation, titrate vent settings prn    CARDIAC  #Distributive shock 2/2 vasoplegia  BP dropped from 120/80 down to 70s/50s shortly after sedation was increased for vent compliance. Patient was bolused with 1L NS and levo was started then quickly weaned off. Likely vasoplegia from sedation which was fluid responsive.  - wean off levo  - fluids as needed for SBP<90    #HTN  On amlodipine 5mg, aspirin 81mg  - on extubation can start home meds as needed    GASTROINTESTINAL  #Concern for Ileus/abdominal distension  - decompress abdomen with sump    RENAL  #C/f diabetes insipidus  Clear urine in muñiz on ED admission, but low suspicion for cerebral herniation given no deficits when sedation lifted. May instead have received diuretics without signout as such or copious fluids while at surgical center.  - UA, urine lytes    INFECTIOUS  #Leukocytosis  Likely 2/2 difficult intubation. Patient may have some degree of aspiration but no consolidation seen on CXR and no fever  - trend CBC, CXR    ENDOCRINE  #no active issues    MISC  Fluids: s/p 1L NS in ED  Electrolytes: replete K>3.8, Mg>1.8, Phos>2.5  Diet: NPO  DVT Prophylaxis: Lovenox  GI Prophylaxis: not indicated  Lines: PIV x2  Code Status: Full code  Dispo: MICU

## 2021-09-14 NOTE — ED ADULT TRIAGE NOTE - CHIEF COMPLAINT QUOTE
pt sustained bronchospasm during intubation at a plastic surgery office; pt was intubated successfully but then transported here for medical management; ETT 6.5 22 @ lip
Yes

## 2021-09-15 VITALS — TEMPERATURE: 98 F

## 2021-09-15 DIAGNOSIS — I10 ESSENTIAL (PRIMARY) HYPERTENSION: ICD-10-CM

## 2021-09-15 LAB
A1C WITH ESTIMATED AVERAGE GLUCOSE RESULT: 5.9 % — HIGH (ref 4–5.6)
ALBUMIN SERPL ELPH-MCNC: 3.6 G/DL — SIGNIFICANT CHANGE UP (ref 3.3–5)
ALP SERPL-CCNC: 77 U/L — SIGNIFICANT CHANGE UP (ref 40–120)
ALT FLD-CCNC: 15 U/L — SIGNIFICANT CHANGE UP (ref 10–45)
ANION GAP SERPL CALC-SCNC: 11 MMOL/L — SIGNIFICANT CHANGE UP (ref 5–17)
AST SERPL-CCNC: 18 U/L — SIGNIFICANT CHANGE UP (ref 10–40)
BASOPHILS # BLD AUTO: 0.02 K/UL — SIGNIFICANT CHANGE UP (ref 0–0.2)
BASOPHILS NFR BLD AUTO: 0.1 % — SIGNIFICANT CHANGE UP (ref 0–2)
BILIRUB SERPL-MCNC: 0.3 MG/DL — SIGNIFICANT CHANGE UP (ref 0.2–1.2)
BUN SERPL-MCNC: 21 MG/DL — SIGNIFICANT CHANGE UP (ref 7–23)
CALCIUM SERPL-MCNC: 8.9 MG/DL — SIGNIFICANT CHANGE UP (ref 8.4–10.5)
CHLORIDE SERPL-SCNC: 104 MMOL/L — SIGNIFICANT CHANGE UP (ref 96–108)
CO2 SERPL-SCNC: 23 MMOL/L — SIGNIFICANT CHANGE UP (ref 22–31)
COVID-19 SPIKE DOMAIN AB INTERP: POSITIVE
COVID-19 SPIKE DOMAIN ANTIBODY RESULT: >250 U/ML — HIGH
CREAT SERPL-MCNC: 0.88 MG/DL — SIGNIFICANT CHANGE UP (ref 0.5–1.3)
CULTURE RESULTS: NO GROWTH — SIGNIFICANT CHANGE UP
EOSINOPHIL # BLD AUTO: 0 K/UL — SIGNIFICANT CHANGE UP (ref 0–0.5)
EOSINOPHIL NFR BLD AUTO: 0 % — SIGNIFICANT CHANGE UP (ref 0–6)
ESTIMATED AVERAGE GLUCOSE: 123 MG/DL — HIGH (ref 68–114)
GLUCOSE SERPL-MCNC: 177 MG/DL — HIGH (ref 70–99)
HCT VFR BLD CALC: 37.1 % — SIGNIFICANT CHANGE UP (ref 34.5–45)
HCV AB S/CO SERPL IA: 0.04 S/CO — SIGNIFICANT CHANGE UP
HCV AB SERPL-IMP: SIGNIFICANT CHANGE UP
HGB BLD-MCNC: 12.3 G/DL — SIGNIFICANT CHANGE UP (ref 11.5–15.5)
IMM GRANULOCYTES NFR BLD AUTO: 0.7 % — SIGNIFICANT CHANGE UP (ref 0–1.5)
LYMPHOCYTES # BLD AUTO: 0.94 K/UL — LOW (ref 1–3.3)
LYMPHOCYTES # BLD AUTO: 5.8 % — LOW (ref 13–44)
MAGNESIUM SERPL-MCNC: 1.8 MG/DL — SIGNIFICANT CHANGE UP (ref 1.6–2.6)
MCHC RBC-ENTMCNC: 29.2 PG — SIGNIFICANT CHANGE UP (ref 27–34)
MCHC RBC-ENTMCNC: 33.2 GM/DL — SIGNIFICANT CHANGE UP (ref 32–36)
MCV RBC AUTO: 88.1 FL — SIGNIFICANT CHANGE UP (ref 80–100)
MONOCYTES # BLD AUTO: 0.3 K/UL — SIGNIFICANT CHANGE UP (ref 0–0.9)
MONOCYTES NFR BLD AUTO: 1.9 % — LOW (ref 2–14)
NEUTROPHILS # BLD AUTO: 14.78 K/UL — HIGH (ref 1.8–7.4)
NEUTROPHILS NFR BLD AUTO: 91.5 % — HIGH (ref 43–77)
NRBC # BLD: 0 /100 WBCS — SIGNIFICANT CHANGE UP (ref 0–0)
PHOSPHATE SERPL-MCNC: 2.7 MG/DL — SIGNIFICANT CHANGE UP (ref 2.5–4.5)
PLATELET # BLD AUTO: 288 K/UL — SIGNIFICANT CHANGE UP (ref 150–400)
POTASSIUM SERPL-MCNC: 4.4 MMOL/L — SIGNIFICANT CHANGE UP (ref 3.5–5.3)
POTASSIUM SERPL-SCNC: 4.4 MMOL/L — SIGNIFICANT CHANGE UP (ref 3.5–5.3)
PROT SERPL-MCNC: 6.6 G/DL — SIGNIFICANT CHANGE UP (ref 6–8.3)
RBC # BLD: 4.21 M/UL — SIGNIFICANT CHANGE UP (ref 3.8–5.2)
RBC # FLD: 13 % — SIGNIFICANT CHANGE UP (ref 10.3–14.5)
SARS-COV-2 IGG+IGM SERPL QL IA: >250 U/ML — HIGH
SARS-COV-2 IGG+IGM SERPL QL IA: POSITIVE
SODIUM SERPL-SCNC: 138 MMOL/L — SIGNIFICANT CHANGE UP (ref 135–145)
SPECIMEN SOURCE: SIGNIFICANT CHANGE UP
WBC # BLD: 16.15 K/UL — HIGH (ref 3.8–10.5)
WBC # FLD AUTO: 16.15 K/UL — HIGH (ref 3.8–10.5)

## 2021-09-15 PROCEDURE — 83520 IMMUNOASSAY QUANT NOS NONAB: CPT

## 2021-09-15 PROCEDURE — U0005: CPT

## 2021-09-15 PROCEDURE — 86769 SARS-COV-2 COVID-19 ANTIBODY: CPT

## 2021-09-15 PROCEDURE — 86803 HEPATITIS C AB TEST: CPT

## 2021-09-15 PROCEDURE — 96374 THER/PROPH/DIAG INJ IV PUSH: CPT

## 2021-09-15 PROCEDURE — 84484 ASSAY OF TROPONIN QUANT: CPT

## 2021-09-15 PROCEDURE — 71045 X-RAY EXAM CHEST 1 VIEW: CPT

## 2021-09-15 PROCEDURE — 82803 BLOOD GASES ANY COMBINATION: CPT

## 2021-09-15 PROCEDURE — 36415 COLL VENOUS BLD VENIPUNCTURE: CPT

## 2021-09-15 PROCEDURE — 83880 ASSAY OF NATRIURETIC PEPTIDE: CPT

## 2021-09-15 PROCEDURE — 83735 ASSAY OF MAGNESIUM: CPT

## 2021-09-15 PROCEDURE — 87086 URINE CULTURE/COLONY COUNT: CPT

## 2021-09-15 PROCEDURE — 99285 EMERGENCY DEPT VISIT HI MDM: CPT

## 2021-09-15 PROCEDURE — 84132 ASSAY OF SERUM POTASSIUM: CPT

## 2021-09-15 PROCEDURE — 83036 HEMOGLOBIN GLYCOSYLATED A1C: CPT

## 2021-09-15 PROCEDURE — 80053 COMPREHEN METABOLIC PANEL: CPT

## 2021-09-15 PROCEDURE — 71045 X-RAY EXAM CHEST 1 VIEW: CPT | Mod: 26

## 2021-09-15 PROCEDURE — 83605 ASSAY OF LACTIC ACID: CPT

## 2021-09-15 PROCEDURE — 84100 ASSAY OF PHOSPHORUS: CPT

## 2021-09-15 PROCEDURE — 81003 URINALYSIS AUTO W/O SCOPE: CPT

## 2021-09-15 PROCEDURE — 96375 TX/PRO/DX INJ NEW DRUG ADDON: CPT

## 2021-09-15 PROCEDURE — 93005 ELECTROCARDIOGRAM TRACING: CPT

## 2021-09-15 PROCEDURE — 85025 COMPLETE CBC W/AUTO DIFF WBC: CPT

## 2021-09-15 PROCEDURE — 82330 ASSAY OF CALCIUM: CPT

## 2021-09-15 PROCEDURE — 84295 ASSAY OF SERUM SODIUM: CPT

## 2021-09-15 PROCEDURE — U0003: CPT

## 2021-09-15 RX ORDER — AMLODIPINE BESYLATE 2.5 MG/1
1 TABLET ORAL
Qty: 30 | Refills: 0
Start: 2021-09-15 | End: 2021-10-14

## 2021-09-15 RX ORDER — ASPIRIN/CALCIUM CARB/MAGNESIUM 324 MG
1 TABLET ORAL
Qty: 30 | Refills: 0
Start: 2021-09-15 | End: 2021-10-14

## 2021-09-15 RX ORDER — INFLUENZA VIRUS VACCINE 15; 15; 15; 15 UG/.5ML; UG/.5ML; UG/.5ML; UG/.5ML
0.5 SUSPENSION INTRAMUSCULAR ONCE
Refills: 0 | Status: DISCONTINUED | OUTPATIENT
Start: 2021-09-15 | End: 2021-09-15

## 2021-09-15 RX ORDER — ALPRAZOLAM 0.25 MG
0.5 TABLET ORAL ONCE
Refills: 0 | Status: DISCONTINUED | OUTPATIENT
Start: 2021-09-15 | End: 2021-09-15

## 2021-09-15 RX ORDER — MAGNESIUM SULFATE 500 MG/ML
1 VIAL (ML) INJECTION ONCE
Refills: 0 | Status: COMPLETED | OUTPATIENT
Start: 2021-09-15 | End: 2021-09-15

## 2021-09-15 RX ADMIN — PROPOFOL 24 MICROGRAM(S)/KG/MIN: 10 INJECTION, EMULSION INTRAVENOUS at 06:13

## 2021-09-15 RX ADMIN — CHLORHEXIDINE GLUCONATE 1 APPLICATION(S): 213 SOLUTION TOPICAL at 06:14

## 2021-09-15 RX ADMIN — Medication 100 GRAM(S): at 06:14

## 2021-09-15 RX ADMIN — Medication 0.5 MILLIGRAM(S): at 12:08

## 2021-09-15 RX ADMIN — ENOXAPARIN SODIUM 40 MILLIGRAM(S): 100 INJECTION SUBCUTANEOUS at 06:54

## 2021-09-15 NOTE — DISCHARGE NOTE PROVIDER - NSDCMRMEDTOKEN_GEN_ALL_CORE_FT
amLODIPine 5 mg oral tablet: 1 tab(s) orally once a day  aspirin 81 mg oral tablet: 1 caplet(s) orally once a day

## 2021-09-15 NOTE — DISCHARGE NOTE PROVIDER - NSDCCPCAREPLAN_GEN_ALL_CORE_FT
PRINCIPAL DISCHARGE DIAGNOSIS  Diagnosis: Difficult airway  Assessment and Plan of Treatment: You were admitted for close monitoring of extubation after the intubation process during an outpatient surgery was difficult. During your admission, you were sedated while on intubation and your blood pressure was monitored closely. After one day in the hospital, you were extubated in the presence of our anesthesiologists without any complication. If you begin to experience increasing shortness of breath, worsening dizziness or lightheadedness, or new swelling in the throat area, please return to our hospital or see a doctor immediately.      SECONDARY DISCHARGE DIAGNOSES  Diagnosis: Hypertension  Assessment and Plan of Treatment: You have a history of hypertension. Please continue to take amlodipine 5 mg daily as prescribed by your physician. Additionally, please continue to take your baby aspirin as prescribed for prevention of cardiac disease.

## 2021-09-15 NOTE — DISCHARGE NOTE NURSING/CASE MANAGEMENT/SOCIAL WORK - PATIENT PORTAL LINK FT
You can access the FollowMyHealth Patient Portal offered by [facility] by registering at the following website: [email or web address]. By joining [employer]’s FollowMyHealth portal, you will also be able to view your health information using other applications (apps) compatible with our system.

## 2021-09-15 NOTE — DISCHARGE NOTE NURSING/CASE MANAGEMENT/SOCIAL WORK - NSDPLANG ASIS_GEN_ALL_CORE
Pt diagnosed last week with UTI while at [facility].  Notes she was given IV rocephin.  She is here today complaining of outer vaginal pain, low pelvic pain and back pain.  Also state she feels like she is having fevers and chills and SOB.    
No

## 2021-09-15 NOTE — DISCHARGE NOTE NURSING/CASE MANAGEMENT/SOCIAL WORK - NSDCPEFALRISK_GEN_ALL_CORE
For information on Fall & injury Prevention, visit [email or web address]

## 2021-09-15 NOTE — PROVIDER CONTACT NOTE (MEDICATION) - SITUATION
MD at bedside when pt admitted to taking 0.25 mg of own home dosed xanax. Reinforced importance of following instructions related to medication.

## 2021-09-15 NOTE — PROGRESS NOTE ADULT - SUBJECTIVE AND OBJECTIVE BOX
SUBJECTIVE: admitted intubated after bronchospasm on induction with rocuronium. Pt intubated.      MEDICATIONS  (STANDING):  chlorhexidine 2% Cloths 1 Application(s) Topical <User Schedule>  enoxaparin Injectable 40 milliGRAM(s) SubCutaneous every 12 hours    MEDICATIONS  (PRN):      Vital Signs Last 24 Hrs  T(C): 36.4 (15 Sep 2021 06:29), Max: 36.6 (15 Sep 2021 01:12)  T(F): 97.5 (15 Sep 2021 06:29), Max: 97.8 (15 Sep 2021 01:12)  HR: 83 (15 Sep 2021 08:59) (53 - 95)  BP: 140/67 (15 Sep 2021 08:00) (78/51 - 186/77)  BP(mean): 97 (15 Sep 2021 08:00) (60 - 97)  RR: 22 (15 Sep 2021 08:59) (0 - 22)  SpO2: 99% (15 Sep 2021 08:59) (95% - 100%)    Physical Exam:  General: sedated  Pulm: intubated    I&O's Summary    14 Sep 2021 07:01  -  15 Sep 2021 07:00  --------------------------------------------------------  IN: 437.6 mL / OUT: 1770 mL / NET: -1332.4 mL    15 Sep 2021 07:01  -  15 Sep 2021 09:13  --------------------------------------------------------  IN: 19.2 mL / OUT: 30 mL / NET: -10.8 mL        LABS:                        12.3   16.15 )-----------( 288      ( 15 Sep 2021 05:37 )             37.1     09-15    138  |  104  |  21  ----------------------------<  177<H>  4.4   |  23  |  0.88    Ca    8.9      15 Sep 2021 05:36  Phos  2.7     -15  Mg     1.8     09-15    TPro  6.6  /  Alb  3.6  /  TBili  0.3  /  DBili  x   /  AST  18  /  ALT  15  /  AlkPhos  77  -15      Urinalysis Basic - ( 14 Sep 2021 17:19 )    Color: Yellow / Appearance: Clear / S.015 / pH: x  Gluc: x / Ketone: NEGATIVE  / Bili: Negative / Urobili: 0.2 E.U./dL   Blood: x / Protein: NEGATIVE mg/dL / Nitrite: NEGATIVE   Leuk Esterase: NEGATIVE / RBC: x / WBC x   Sq Epi: x / Non Sq Epi: x / Bacteria: x      CAPILLARY BLOOD GLUCOSE        LIVER FUNCTIONS - ( 15 Sep 2021 05:36 )  Alb: 3.6 g/dL / Pro: 6.6 g/dL / ALK PHOS: 77 U/L / ALT: 15 U/L / AST: 18 U/L / GGT: x             RADIOLOGY & ADDITIONAL STUDIES:  
[place] website --- [email or web address] for Tobacco [email or web address] website --- [email or web address]

## 2021-09-15 NOTE — DISCHARGE NOTE PROVIDER - HOSPITAL COURSE
#Discharge: do not delete    Patient is 72 yo F with past medical history of HTN, HLD, prior anaphylaxis with unknown trigger x2, allergies to latex and penicillin presents from the ambulatory surgery center of Dr. Magana intubated with concern for anaphylaxis during intubation prior to elective facelift    Hospital course (by problem):   #Difficult Airway  Patient with 3 failed DL attempts and some bloody sputum afterwards which resolved per anesthesiologist. Given epinephrine, decadron, and benadryl prior to admission. Unclear etiology but likely vocal cord spasm vs. anaphylaxis. Admitted for supervised extubation with anesthesia at bedside. Successfully extubated on HD1.     #Anaphylaxis  Patient s/p epinephrine, benadryl, decadron, nebs at outpatient surgical center. Unclear whether this is resolved anaphylaxis or instead traumatic/difficult intubation  - monitor for dermatologic signs or bronchospasm on extubation  - avoid rocuronium    #Hypercapnic respiratory failure  VBG pCO2 58, which may correlate with obstruction 2/2 anaphylaxis, but does not correlate with clear lungs on exam without recent albuterol.  - ABG to confirm ventilation, titrate vent settings prn  - initial ABG results showed adequate ventilation settings for overnight; will continue to monitor pt's response     #Distributive shock 2/2 vasoplegia  BP dropped from 120/80 down to 70s/50s shortly after sedation was increased for vent compliance. Patient was bolused with 1L NS and levophed was started then quickly weaned off. Likely vasoplegia from sedation which was fluid responsive. Pt weaned from levophed and VSS.    Patient was discharged to: home    New medications: None    Changes to old medications: None    Medications that were stopped: None    Items to follow up as outpatient:    Physical exam at the time of discharge:               #Discharge: do not delete    Patient is 72 yo F with past medical history of HTN, HLD, prior anaphylaxis with unknown trigger x2, allergies to latex and penicillin presents from the ambulatory surgery center of Dr. Magana intubated with concern for anaphylaxis during intubation prior to elective facelift    Hospital course (by problem):   #Difficult Airway  Patient with 3 failed DL attempts at [facility] surgery [place] then successful intubated with 6.5 tube. Given epinephrine, decadron, and benadryl prior to admission due to concern for anaphylaxis. Unclear etiology at this time but differential includes vocal cord paralysis v. anaphylaxis. Admitted for and now s/p supervised extubation with anesthesia at bedside.     #Abdominal Distension  On admission, concern for ileus/abdominal distension, so sump placed. Successful removed on hospital day 1. Resolved.    #Distributive shock 2/2 Vasoplegia  BP dropped from 120/80 down to 70s/50s shortly after sedation was increased for vent compliance. Patient was bolused with 1L NS and levophed was started then quickly weaned off. Pt continued to receive levophed and sedation on admission and weaned from both on hospital day 1. Vitals signs now stable and considered safe for discharge.    Patient was discharged to: home    New medications: None    Changes to old medications: None    Medications that were stopped: None    Items to follow up as outpatient: None    Physical exam at the time of discharge:               #Discharge: do not delete    Patient is 72 yo F with past medical history of HTN, HLD, prior anaphylaxis with unknown trigger x2, allergies to latex and penicillin presents from the ambulatory surgery center of Dr. Magana intubated with concern for anaphylaxis during intubation prior to elective facelift    Hospital course (by problem):   #Difficult Airway  Patient with 3 failed DL attempts at [facility] surgery [place] then successful intubated with 6.5 tube. Given epinephrine, decadron, and benadryl prior to admission due to concern for anaphylaxis. Unclear etiology at this time but differential includes vocal cord paralysis v. anaphylaxis. Admitted for and now s/p supervised extubation with anesthesia at bedside.     #Abdominal Distension  On admission, concern for ileus/abdominal distension, so sump placed. Successful removed on hospital day 1. Resolved.    #Distributive shock 2/2 Vasoplegia  BP dropped from 120/80 down to 70s/50s shortly after sedation was increased for vent compliance. Patient was bolused with 1L NS and levophed was started then quickly weaned off. Pt continued to receive levophed and sedation on admission and weaned from both on hospital day 1. Vitals signs now stable and considered safe for discharge.    Patient was discharged to: home    New medications: None    Changes to old medications: None    Medications that were stopped: None    Items to follow up as outpatient: None    Physical exam at the time of discharge:  Gen: NAD, sitting in chair, well appearing, alert, interactive  HEENT: anicteric sclera, MMM  Cardio: +S1/S2, RRR, no murmurs  Resp: CTA b/l  GI: +BS x4, NT/ND  Ext: no peripheral edema, NROM x4  Vasc: 2+ peripheral pulses  Skin: warm, dry, and intact. no rashes, wounds or scars  Neuro: AAOx3

## 2021-09-15 NOTE — PROGRESS NOTE ADULT - ASSESSMENT
71 F PMHx of HTN, HLD, anaphylaxis presented to [facility] ED intubated after bronchospasm during induction with rocuronium for face lift at [facility] surgical center. Admitted for controlled extubation for 9/15.  - Plastic surgery available and will continue to follow

## 2021-09-15 NOTE — PROCEDURE NOTE - NSPROCDETAILS_GEN_ALL_CORE
nasogastric/audible air bolus/placement confirmed by auscultation/bowel sounds present to 4 quadrants/connected to suction

## 2021-09-18 LAB — TRYPTASE SERPL-MCNC: 6.7 UG/L — SIGNIFICANT CHANGE UP (ref 2.2–13.2)

## 2021-09-21 DIAGNOSIS — Z87.891 PERSONAL HISTORY OF NICOTINE DEPENDENCE: ICD-10-CM
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
## 2021-09-21 DIAGNOSIS — T88.59XA OTHER COMPLICATIONS OF ANESTHESIA, INITIAL ENCOUNTER: ICD-10-CM

## 2021-09-21 DIAGNOSIS — Z91.040 LATEX ALLERGY STATUS: ICD-10-CM

## 2021-09-21 DIAGNOSIS — Z88.8 ALLERGY STATUS TO OTHER DRUGS, MEDICAMENTS AND BIOLOGICAL SUBSTANCES: ICD-10-CM

## 2021-09-21 DIAGNOSIS — J98.01 ACUTE BRONCHOSPASM: ICD-10-CM

## 2021-09-21 DIAGNOSIS — Z79.82 LONG TERM (CURRENT) USE OF ASPIRIN: ICD-10-CM
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
## 2021-09-21 DIAGNOSIS — E78.5 HYPERLIPIDEMIA, UNSPECIFIED: ICD-10-CM
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
## 2021-09-21 DIAGNOSIS — I10 ESSENTIAL (PRIMARY) HYPERTENSION: ICD-10-CM

## 2021-09-21 DIAGNOSIS — G92 TOXIC ENCEPHALOPATHY: ICD-10-CM

## 2021-09-21 DIAGNOSIS — T88.6XXA ANAPHYLACTIC REACTION DUE TO ADVERSE EFFECT OF CORRECT DRUG OR MEDICAMENT PROPERLY ADMINISTERED, INITIAL ENCOUNTER: ICD-10-CM

## 2021-09-21 DIAGNOSIS — K56.7 ILEUS, UNSPECIFIED: ICD-10-CM

## 2021-09-21 DIAGNOSIS — J96.02 ACUTE RESPIRATORY FAILURE WITH HYPERCAPNIA: ICD-10-CM

## 2021-09-21 DIAGNOSIS — T48.1X5A ADVERSE EFFECT OF SKELETAL MUSCLE RELAXANTS [NEUROMUSCULAR BLOCKING AGENTS], INITIAL ENCOUNTER: ICD-10-CM

## 2021-09-21 DIAGNOSIS — J38.00 PARALYSIS OF VOCAL CORDS AND LARYNX, UNSPECIFIED: ICD-10-CM

## 2021-09-21 DIAGNOSIS — Z88.0 ALLERGY STATUS TO PENICILLIN: ICD-10-CM

## 2022-11-07 NOTE — ED ADULT NURSE NOTE - NSFALLRSKINDICATORS_ED_ALL_ED
yes Perilesional Excision Additional Text (Leave Blank If You Do Not Want): The margin was drawn around the clinically apparent lesion. Incisions were then made along these lines to the appropriate tissue plane and the lesion was extirpated.

## 2024-04-10 RX ORDER — AMLODIPINE BESYLATE 2.5 MG/1
1 TABLET ORAL
Qty: 0 | Refills: 0 | DISCHARGE

## 2024-04-10 RX ORDER — ASPIRIN/CALCIUM CARB/MAGNESIUM 324 MG
1 TABLET ORAL
Qty: 0 | Refills: 0 | DISCHARGE

## 2024-11-26 NOTE — PATIENT PROFILE ADULT - NSPRESCRUSEDDRG_GEN_A_NUR
STEFF due to Intubated/sedated Render In Strict Bullet Format?: No Continue Regimen: Take one capsule isotretinoin 40mg PO QD with food. Plan: RTC in 1 month. Detail Level: Zone

## 2025-05-07 PROBLEM — Z00.00 ENCOUNTER FOR PREVENTIVE HEALTH EXAMINATION: Status: ACTIVE | Noted: 2025-05-07

## 2025-05-19 ENCOUNTER — NON-APPOINTMENT (OUTPATIENT)
Age: 75
End: 2025-05-19

## 2025-05-21 ENCOUNTER — LABORATORY RESULT (OUTPATIENT)
Age: 75
End: 2025-05-21

## 2025-05-21 ENCOUNTER — NON-APPOINTMENT (OUTPATIENT)
Age: 75
End: 2025-05-21

## 2025-05-21 ENCOUNTER — APPOINTMENT (OUTPATIENT)
Dept: NEUROLOGY | Facility: CLINIC | Age: 75
End: 2025-05-21
Payer: MEDICARE

## 2025-05-21 VITALS
BODY MASS INDEX: 25.91 KG/M2 | SYSTOLIC BLOOD PRESSURE: 154 MMHG | HEIGHT: 60 IN | DIASTOLIC BLOOD PRESSURE: 71 MMHG | HEART RATE: 81 BPM | WEIGHT: 132 LBS

## 2025-05-21 DIAGNOSIS — H53.2 DIPLOPIA: ICD-10-CM

## 2025-05-21 LAB
ALBUMIN SERPL ELPH-MCNC: 4.5 G/DL
ALP BLD-CCNC: 90 U/L
ALT SERPL-CCNC: 14 U/L
ANION GAP SERPL CALC-SCNC: 14 MMOL/L
AST SERPL-CCNC: 23 U/L
BASOPHILS # BLD AUTO: 0.04 K/UL
BASOPHILS NFR BLD AUTO: 0.4 %
BILIRUB SERPL-MCNC: 0.3 MG/DL
BUN SERPL-MCNC: 21 MG/DL
CALCIUM SERPL-MCNC: 9.8 MG/DL
CHLORIDE SERPL-SCNC: 101 MMOL/L
CK SERPL-CCNC: 88 U/L
CO2 SERPL-SCNC: 23 MMOL/L
CREAT SERPL-MCNC: 0.77 MG/DL
CRP SERPL-MCNC: <3 MG/L
EGFRCR SERPLBLD CKD-EPI 2021: 81 ML/MIN/1.73M2
EOSINOPHIL # BLD AUTO: 0.05 K/UL
EOSINOPHIL NFR BLD AUTO: 0.4 %
ERYTHROCYTE [SEDIMENTATION RATE] IN BLOOD BY WESTERGREN METHOD: 22 MM/HR
GLUCOSE SERPL-MCNC: 103 MG/DL
HCT VFR BLD CALC: 38.9 %
HGB BLD-MCNC: 13 G/DL
IMM GRANULOCYTES NFR BLD AUTO: 0.4 %
LYMPHOCYTES # BLD AUTO: 1.2 K/UL
LYMPHOCYTES NFR BLD AUTO: 10.6 %
MAN DIFF?: NORMAL
MCHC RBC-ENTMCNC: 29.6 PG
MCHC RBC-ENTMCNC: 33.4 G/DL
MCV RBC AUTO: 88.6 FL
MONOCYTES # BLD AUTO: 0.43 K/UL
MONOCYTES NFR BLD AUTO: 3.8 %
NEUTROPHILS # BLD AUTO: 9.52 K/UL
NEUTROPHILS NFR BLD AUTO: 84.4 %
PLATELET # BLD AUTO: 288 K/UL
POTASSIUM SERPL-SCNC: 4.5 MMOL/L
PROT SERPL-MCNC: 7.1 G/DL
RBC # BLD: 4.39 M/UL
RBC # FLD: 13.1 %
SODIUM SERPL-SCNC: 138 MMOL/L
T PALLIDUM AB SER QL IA: NEGATIVE
THYROGLOB AB SERPL-ACNC: 15.7 IU/ML
THYROPEROXIDASE AB SERPL IA-ACNC: 14.2 IU/ML
TSH SERPL-ACNC: 1.38 UIU/ML
WBC # FLD AUTO: 11.28 K/UL

## 2025-05-21 PROCEDURE — 99204 OFFICE O/P NEW MOD 45 MIN: CPT

## 2025-05-22 LAB
ACE BLD-CCNC: 30 U/L
ESTIMATED AVERAGE GLUCOSE: 108 MG/DL
HBA1C MFR BLD HPLC: 5.4 %

## 2025-05-23 LAB
ALBUMIN MFR SERPL ELPH: 60.9 %
ALBUMIN SERPL-MCNC: 4.3 G/DL
ALBUMIN/GLOB SERPL: 1.5 RATIO
ALPHA1 GLOB MFR SERPL ELPH: 4.6 %
ALPHA1 GLOB SERPL ELPH-MCNC: 0.3 G/DL
ALPHA2 GLOB MFR SERPL ELPH: 10.2 %
ALPHA2 GLOB SERPL ELPH-MCNC: 0.7 G/DL
B-GLOBULIN MFR SERPL ELPH: 12 %
B-GLOBULIN SERPL ELPH-MCNC: 0.9 G/DL
DEPRECATED KAPPA LC FREE/LAMBDA SER: 1.11 RATIO
GAMMA GLOB FLD ELPH-MCNC: 0.9 G/DL
GAMMA GLOB MFR SERPL ELPH: 12.3 %
IGA SERPL-MCNC: 253 MG/DL
IGG SERPL-MCNC: 841 MG/DL
IGM SERPL-MCNC: 187 MG/DL
INTERPRETATION SERPL IEP-IMP: NORMAL
KAPPA LC CSF-MCNC: 1.05 MG/DL
KAPPA LC SERPL-MCNC: 1.17 MG/DL
M PROTEIN SPEC IFE-MCNC: NORMAL
PROT SERPL-MCNC: 7.1 G/DL
PROT SERPL-MCNC: 7.1 G/DL

## 2025-05-24 LAB
STRIA MUS AB SER QL: NORMAL
VGCC-P/Q BIND AB SER-SCNC: 0 PMOL/L

## 2025-05-26 LAB
ACRM BINDING ANTIBODY: <0.07 NMOL/L
ANACR T: NEGATIVE

## 2025-05-27 LAB — ACHR BLOCK AB SER QL: 12 %

## 2025-05-28 LAB
ACHR MOD AB SER-ACNC: 11 %
IGA 24H UR QL IFE: NORMAL

## 2025-05-29 LAB
AMPA-R ABCBA: NEGATIVE
AMPHIPHYSIN IGG TITR SER IF: NEGATIVE
ANNOTATION COMMENT IMP: NORMAL
CASPR2 IGG SER QL CBA IFA: NEGATIVE
CV2 AB SERPL QL IF: NEGATIVE
DPPX IGG SER QL CBA IFA: NEGATIVE
GABA-B ABCBA: NEGATIVE
GAD65 AB SER-MCNC: 0.01 NMOL/L
GFAP IFA, S: NEGATIVE
GLIAL NUC TYPE 1 AB TITR SER: NEGATIVE
HU1 AB SER QL: NEGATIVE
HU2 AB TITR SER IF: NEGATIVE
HU3 AB TITR SER: NEGATIVE
IMMUNOLOGIST REVIEW: NORMAL
LGI1 IGG SER QL CBA IFA: NEGATIVE
LGI1 IGG SER QL CBA IFA: NEGATIVE
MGLUR1 AB IFA, S: NEGATIVE
NEUROCHONDRIN-IFA, SERUM: NEGATIVE
NIF IFA, S: NEGATIVE
NMDA-R ABCBA: NEGATIVE
PCA-1 AB TITR SER: NEGATIVE
PCA-TR AB TITR SER: NEGATIVE

## 2025-06-02 ENCOUNTER — NON-APPOINTMENT (OUTPATIENT)
Age: 75
End: 2025-06-02

## 2025-06-04 ENCOUNTER — NON-APPOINTMENT (OUTPATIENT)
Age: 75
End: 2025-06-04

## 2025-06-04 ENCOUNTER — APPOINTMENT (OUTPATIENT)
Dept: OPHTHALMOLOGY | Facility: CLINIC | Age: 75
End: 2025-06-04
Payer: MEDICARE

## 2025-06-04 PROCEDURE — 92083 EXTENDED VISUAL FIELD XM: CPT

## 2025-06-04 PROCEDURE — 99204 OFFICE O/P NEW MOD 45 MIN: CPT

## 2025-06-04 PROCEDURE — 92133 CPTRZD OPH DX IMG PST SGM ON: CPT
